# Patient Record
Sex: FEMALE | Race: WHITE | Employment: UNEMPLOYED | URBAN - METROPOLITAN AREA
[De-identification: names, ages, dates, MRNs, and addresses within clinical notes are randomized per-mention and may not be internally consistent; named-entity substitution may affect disease eponyms.]

---
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
## 2022-05-31 LAB
ANTIBODY SCREEN, EXTERNAL: NORMAL
HBSAG, EXTERNAL: NORMAL
RUBELLA, EXTERNAL: NORMAL
TYPE, ABO & RH, EXTERNAL: NORMAL

## 2022-12-09 LAB — GRBS, EXTERNAL: NORMAL

## 2022-12-28 ENCOUNTER — HOSPITAL ENCOUNTER (INPATIENT)
Age: 22
LOS: 2 days | Discharge: HOME | End: 2022-12-30
Attending: STUDENT IN AN ORGANIZED HEALTH CARE EDUCATION/TRAINING PROGRAM | Admitting: OBSTETRICS & GYNECOLOGY
Payer: COMMERCIAL

## 2022-12-28 PROBLEM — Z34.90 PREGNANCY: Status: ACTIVE | Noted: 2022-12-28

## 2022-12-28 PROCEDURE — 75410000002 HC LABOR FEE PER 1 HR: Performed by: STUDENT IN AN ORGANIZED HEALTH CARE EDUCATION/TRAINING PROGRAM

## 2022-12-28 PROCEDURE — 65270000029 HC RM PRIVATE

## 2022-12-28 RX ORDER — SODIUM CHLORIDE 0.9 % (FLUSH) 0.9 %
5-40 SYRINGE (ML) INJECTION EVERY 8 HOURS
Status: DISCONTINUED | OUTPATIENT
Start: 2022-12-29 | End: 2022-12-29

## 2022-12-28 RX ORDER — SODIUM CHLORIDE 0.9 % (FLUSH) 0.9 %
5-40 SYRINGE (ML) INJECTION AS NEEDED
Status: DISCONTINUED | OUTPATIENT
Start: 2022-12-28 | End: 2022-12-29

## 2022-12-28 RX ORDER — BUSPIRONE HYDROCHLORIDE 30 MG/1
30 TABLET ORAL 2 TIMES DAILY
COMMUNITY

## 2022-12-28 RX ORDER — OXYTOCIN/RINGER'S LACTATE 30/500 ML
10 PLASTIC BAG, INJECTION (ML) INTRAVENOUS AS NEEDED
Status: COMPLETED | OUTPATIENT
Start: 2022-12-28 | End: 2022-12-29

## 2022-12-28 RX ORDER — NALOXONE HYDROCHLORIDE 0.4 MG/ML
0.4 INJECTION, SOLUTION INTRAMUSCULAR; INTRAVENOUS; SUBCUTANEOUS AS NEEDED
Status: DISCONTINUED | OUTPATIENT
Start: 2022-12-28 | End: 2022-12-29

## 2022-12-28 RX ORDER — OXYTOCIN/RINGER'S LACTATE 30/500 ML
87.3 PLASTIC BAG, INJECTION (ML) INTRAVENOUS AS NEEDED
Status: DISCONTINUED | OUTPATIENT
Start: 2022-12-28 | End: 2022-12-29

## 2022-12-28 RX ORDER — SODIUM CHLORIDE, SODIUM LACTATE, POTASSIUM CHLORIDE, CALCIUM CHLORIDE 600; 310; 30; 20 MG/100ML; MG/100ML; MG/100ML; MG/100ML
125 INJECTION, SOLUTION INTRAVENOUS CONTINUOUS
Status: DISCONTINUED | OUTPATIENT
Start: 2022-12-29 | End: 2022-12-29

## 2022-12-29 ENCOUNTER — ANESTHESIA EVENT (OUTPATIENT)
Dept: LABOR AND DELIVERY | Age: 22
End: 2022-12-29
Payer: COMMERCIAL

## 2022-12-29 ENCOUNTER — ANESTHESIA (OUTPATIENT)
Dept: LABOR AND DELIVERY | Age: 22
End: 2022-12-29
Payer: COMMERCIAL

## 2022-12-29 LAB
BASOPHILS # BLD: 0 K/UL (ref 0–0.1)
BASOPHILS NFR BLD: 0 % (ref 0–1)
DIFFERENTIAL METHOD BLD: ABNORMAL
EOSINOPHIL # BLD: 0 K/UL (ref 0–0.4)
EOSINOPHIL NFR BLD: 0 % (ref 0–7)
ERYTHROCYTE [DISTWIDTH] IN BLOOD BY AUTOMATED COUNT: 12.7 % (ref 11.5–14.5)
HCT VFR BLD AUTO: 39.8 % (ref 35–47)
HGB BLD-MCNC: 13.6 G/DL (ref 11.5–16)
IMM GRANULOCYTES # BLD AUTO: 0.1 K/UL (ref 0–0.04)
IMM GRANULOCYTES NFR BLD AUTO: 1 % (ref 0–0.5)
LYMPHOCYTES # BLD: 1.6 K/UL (ref 0.8–3.5)
LYMPHOCYTES NFR BLD: 10 % (ref 12–49)
MCH RBC QN AUTO: 30.8 PG (ref 26–34)
MCHC RBC AUTO-ENTMCNC: 34.2 G/DL (ref 30–36.5)
MCV RBC AUTO: 90.2 FL (ref 80–99)
MONOCYTES # BLD: 0.8 K/UL (ref 0–1)
MONOCYTES NFR BLD: 5 % (ref 5–13)
NEUTS SEG # BLD: 13.3 K/UL (ref 1.8–8)
NEUTS SEG NFR BLD: 84 % (ref 32–75)
NRBC # BLD: 0 K/UL (ref 0–0.01)
NRBC BLD-RTO: 0 PER 100 WBC
PLATELET # BLD AUTO: 247 K/UL (ref 150–400)
PMV BLD AUTO: 11.2 FL (ref 8.9–12.9)
RBC # BLD AUTO: 4.41 M/UL (ref 3.8–5.2)
WBC # BLD AUTO: 15.9 K/UL (ref 3.6–11)

## 2022-12-29 PROCEDURE — 74011000250 HC RX REV CODE- 250: Performed by: ANESTHESIOLOGY

## 2022-12-29 PROCEDURE — 75410000000 HC DELIVERY VAGINAL/SINGLE: Performed by: STUDENT IN AN ORGANIZED HEALTH CARE EDUCATION/TRAINING PROGRAM

## 2022-12-29 PROCEDURE — 75410000002 HC LABOR FEE PER 1 HR: Performed by: STUDENT IN AN ORGANIZED HEALTH CARE EDUCATION/TRAINING PROGRAM

## 2022-12-29 PROCEDURE — 74011250637 HC RX REV CODE- 250/637: Performed by: STUDENT IN AN ORGANIZED HEALTH CARE EDUCATION/TRAINING PROGRAM

## 2022-12-29 PROCEDURE — 74011250636 HC RX REV CODE- 250/636: Performed by: ADVANCED PRACTICE MIDWIFE

## 2022-12-29 PROCEDURE — 74011250636 HC RX REV CODE- 250/636: Performed by: STUDENT IN AN ORGANIZED HEALTH CARE EDUCATION/TRAINING PROGRAM

## 2022-12-29 PROCEDURE — 75410000003 HC RECOV DEL/VAG/CSECN EA 0.5 HR: Performed by: STUDENT IN AN ORGANIZED HEALTH CARE EDUCATION/TRAINING PROGRAM

## 2022-12-29 PROCEDURE — 0UQMXZZ REPAIR VULVA, EXTERNAL APPROACH: ICD-10-PCS | Performed by: STUDENT IN AN ORGANIZED HEALTH CARE EDUCATION/TRAINING PROGRAM

## 2022-12-29 PROCEDURE — 00HU33Z INSERTION OF INFUSION DEVICE INTO SPINAL CANAL, PERCUTANEOUS APPROACH: ICD-10-PCS | Performed by: ANESTHESIOLOGY

## 2022-12-29 PROCEDURE — 65270000029 HC RM PRIVATE

## 2022-12-29 PROCEDURE — 0UQGXZZ REPAIR VAGINA, EXTERNAL APPROACH: ICD-10-PCS | Performed by: STUDENT IN AN ORGANIZED HEALTH CARE EDUCATION/TRAINING PROGRAM

## 2022-12-29 PROCEDURE — 77030014125 HC TY EPDRL BBMI -B: Performed by: ANESTHESIOLOGY

## 2022-12-29 PROCEDURE — 4A1HXCZ MONITORING OF PRODUCTS OF CONCEPTION, CARDIAC RATE, EXTERNAL APPROACH: ICD-10-PCS | Performed by: STUDENT IN AN ORGANIZED HEALTH CARE EDUCATION/TRAINING PROGRAM

## 2022-12-29 PROCEDURE — 76060000078 HC EPIDURAL ANESTHESIA: Performed by: ANESTHESIOLOGY

## 2022-12-29 PROCEDURE — 85025 COMPLETE CBC W/AUTO DIFF WBC: CPT

## 2022-12-29 PROCEDURE — 36415 COLL VENOUS BLD VENIPUNCTURE: CPT

## 2022-12-29 RX ORDER — OXYTOCIN/RINGER'S LACTATE 30/500 ML
87.3 PLASTIC BAG, INJECTION (ML) INTRAVENOUS AS NEEDED
Status: DISCONTINUED | OUTPATIENT
Start: 2022-12-29 | End: 2022-12-30 | Stop reason: HOSPADM

## 2022-12-29 RX ORDER — ONDANSETRON 2 MG/ML
4 INJECTION INTRAMUSCULAR; INTRAVENOUS
Status: DISCONTINUED | OUTPATIENT
Start: 2022-12-29 | End: 2022-12-29

## 2022-12-29 RX ORDER — ACETAMINOPHEN 325 MG/1
650 TABLET ORAL
Status: DISCONTINUED | OUTPATIENT
Start: 2022-12-29 | End: 2022-12-30 | Stop reason: HOSPADM

## 2022-12-29 RX ORDER — EPHEDRINE SULFATE/0.9% NACL/PF 50 MG/5 ML
10 SYRINGE (ML) INTRAVENOUS
Status: DISCONTINUED | OUTPATIENT
Start: 2022-12-29 | End: 2022-12-29

## 2022-12-29 RX ORDER — PEPPERMINT OIL
OIL (ML) MISCELLANEOUS ONCE
Status: DISCONTINUED | OUTPATIENT
Start: 2022-12-29 | End: 2022-12-29

## 2022-12-29 RX ORDER — IBUPROFEN 800 MG/1
800 TABLET ORAL EVERY 8 HOURS
Status: DISCONTINUED | OUTPATIENT
Start: 2022-12-29 | End: 2022-12-30 | Stop reason: HOSPADM

## 2022-12-29 RX ORDER — PEPPERMINT OIL
SPIRIT ORAL ONCE
Status: COMPLETED | OUTPATIENT
Start: 2022-12-29 | End: 2022-12-29

## 2022-12-29 RX ORDER — DOCUSATE SODIUM 100 MG/1
100 CAPSULE, LIQUID FILLED ORAL
Status: DISCONTINUED | OUTPATIENT
Start: 2022-12-29 | End: 2022-12-30 | Stop reason: HOSPADM

## 2022-12-29 RX ORDER — FENTANYL/BUPIVACAINE/NS/PF 2-1250MCG
1-16 PREFILLED PUMP RESERVOIR EPIDURAL CONTINUOUS
Status: DISCONTINUED | OUTPATIENT
Start: 2022-12-29 | End: 2022-12-29

## 2022-12-29 RX ORDER — BUPIVACAINE HYDROCHLORIDE 2.5 MG/ML
INJECTION, SOLUTION EPIDURAL; INFILTRATION; INTRACAUDAL AS NEEDED
Status: DISCONTINUED | OUTPATIENT
Start: 2022-12-29 | End: 2022-12-29 | Stop reason: HOSPADM

## 2022-12-29 RX ORDER — DIPHENHYDRAMINE HCL 25 MG
25 CAPSULE ORAL
Status: DISCONTINUED | OUTPATIENT
Start: 2022-12-29 | End: 2022-12-30 | Stop reason: HOSPADM

## 2022-12-29 RX ORDER — ONDANSETRON 4 MG/1
4 TABLET, ORALLY DISINTEGRATING ORAL
Status: ACTIVE | OUTPATIENT
Start: 2022-12-29 | End: 2022-12-30

## 2022-12-29 RX ORDER — OXYTOCIN/RINGER'S LACTATE 30/500 ML
10 PLASTIC BAG, INJECTION (ML) INTRAVENOUS AS NEEDED
Status: DISCONTINUED | OUTPATIENT
Start: 2022-12-29 | End: 2022-12-30 | Stop reason: HOSPADM

## 2022-12-29 RX ORDER — FOLIC ACID/MULTIVIT,IRON,MINER 0.4MG-18MG
1 TABLET ORAL DAILY
Status: DISCONTINUED | OUTPATIENT
Start: 2022-12-29 | End: 2022-12-30 | Stop reason: HOSPADM

## 2022-12-29 RX ORDER — SIMETHICONE 80 MG
80 TABLET,CHEWABLE ORAL
Status: DISCONTINUED | OUTPATIENT
Start: 2022-12-29 | End: 2022-12-30 | Stop reason: HOSPADM

## 2022-12-29 RX ADMIN — Medication 10000 MILLI-UNITS: at 08:35

## 2022-12-29 RX ADMIN — BUPIVACAINE HYDROCHLORIDE 10 ML: 2.5 INJECTION, SOLUTION EPIDURAL; INFILTRATION; INTRACAUDAL; PERINEURAL at 01:31

## 2022-12-29 RX ADMIN — ONDANSETRON 4 MG: 2 INJECTION INTRAMUSCULAR; INTRAVENOUS at 08:01

## 2022-12-29 RX ADMIN — IBUPROFEN 800 MG: 800 TABLET, FILM COATED ORAL at 17:03

## 2022-12-29 RX ADMIN — DOCUSATE SODIUM 100 MG: 100 CAPSULE, LIQUID FILLED ORAL at 17:03

## 2022-12-29 RX ADMIN — SODIUM CHLORIDE, POTASSIUM CHLORIDE, SODIUM LACTATE AND CALCIUM CHLORIDE 125 ML/HR: 600; 310; 30; 20 INJECTION, SOLUTION INTRAVENOUS at 00:23

## 2022-12-29 RX ADMIN — Medication 10 ML/HR: at 01:55

## 2022-12-29 RX ADMIN — Medication: at 12:07

## 2022-12-29 RX ADMIN — SODIUM CHLORIDE, POTASSIUM CHLORIDE, SODIUM LACTATE AND CALCIUM CHLORIDE 125 ML/HR: 600; 310; 30; 20 INJECTION, SOLUTION INTRAVENOUS at 01:25

## 2022-12-29 RX ADMIN — Medication 1 TABLET: at 12:07

## 2022-12-29 NOTE — PROGRESS NOTES
2139 Pt arrived from ER with c/o contractions. Pt reports positive fetal movement, no leaking of fluid, or vaginal bleeding. 65 RN at bedside. Anahy Maya CNM in room to assess pt. Pt to be admitted for labor. 0025 Pt requesting epidural at this time, fluid bolus started. 1745 RN spoke with Dr Meliton John MD abouot pt request for epidural. MD states she will assess pt shortly. 3603 RN at bedside. Dr Meliton John MD at bedside for epidural placement. 9534 RN and Eitan Tran at bedside to assess pt. CNM recommends AROM of forebag to further progress labor. Time allotted for questions and all questions answered. Pt agrees to AROM of forebag.

## 2022-12-29 NOTE — PROGRESS NOTES
0710 Bedside SBAR rec'd from [address] [place] [address] Patient actively pushing. RN remains in continuous attendance at the bedside. Assessment & evaluation of fetal heart rate ongoing via continuous EFM.  5514 RN remained at bedside throughout pushing. EFM continuously assessed. Vaginal delivery of viable infant. 1108 Assisted patient in ambulating to bathroom. Patient unable to void. Running water started and position changes suggested on commode. 1120 Patient still unable to void; ambulated back to bed; will continue to monitor and will re-attempt shortly. 1230 Assisted patient in ambulating to bathroom. Peppermint oil used in hat to facilitate voiding. Patient able to empty bladder. 1630 Patient transferred to [facility]; bedside SBAR given to [facility] RN.

## 2022-12-29 NOTE — H&P
[facility] History & Physical    Subjective:     Stebbins Degree is a 25 y.o. female  SIUP @ 39w4 d by Estimated Date of Delivery: 23 who arrives to L&D with chief complaint of contractions since 1400. Endorses +FM, intact membranes; Pt denies vaginal bleeding, fever/chills, chest pain, SOB, HA, scotomata, sudden swelling, nor malaise. Pregnancy problems include:   - Body mass index is 26.04 kg/m². -   Patient Active Problem List   Diagnosis Code    H/O seasonal allergies Z88.9    Pregnancy Z34.90       Allergies  - No Known Allergies    Prenatal Labs  O positive  AS negative  Rubella immune  VDRL non reactive  Hep B neg  HIV neg   GBS negative       OB History  OB History          1    Para        Term                AB        Living             SAB        IAB        Ectopic        Molar        Multiple        Live Births                     PMHx  Past Medical History:   Diagnosis Date    TMJ (temporomandibular joint syndrome)     Resolved    Anxiety     PSHx  History reviewed. No pertinent surgical history. F/SHx  Family History   Problem Relation Age of Onset    Hypertension Maternal Grandmother     Hypertension Maternal Grandfather           Home Medications:  Prior to Admission Medications   Prescriptions Last Dose Informant Patient Reported? Taking? PNV Comb #2-Iron-FA-Omega 3 29-1-400 mg cmpk 2022 at 0900  Yes Yes   Sig: Take  by mouth. Indications: pregnancy   busPIRone (BUSPAR) 30 mg tablet 2022 at 0900  Yes Yes   Sig: Take 30 mg by mouth two (2) times a day. Indications: repeated episodes of anxiety   cetirizine (ZYRTEC) 10 mg tablet 2022 at 0900  Yes Yes   Sig: Take  by mouth daily. Facility-Administered Medications: None        Review of Systems:  A comprehensive review of systems was negative except for that written in the History of Present Illness.       Objective:     Vitals:    22 2223 22 2224   BP: (!) 144/77    Pulse: 77    Resp: 16    Temp: 98.1 °F (36.7 °C)    SpO2: 99%    Weight:  66.7 kg (147 lb)   Height:  5' 3\" (1.6 m)    Bps normotensive on repeat     Body mass index is 26.04 kg/m². Physical Exam:  General:  Alert, cooperative, no distress, appears stated age. Lungs:   Clear to auscultation bilaterally. Symmetrical expansion, non-labored   Heart:  Regular rate and rhythm   GI/Abdomen:   Soft, non-tender. Gravid. Extremities: Extremities normal, atraumatic, no cyanosis or edema.    Skin: Skin color, texture, turgor normal. No rashes or lesions   /Cervical Exam: no lesions or erythema  Cervix: 4/80//-1  Fetal Presentation: Vertex by Leopolds    Electronic Fetal Monitoring    Fetal Heart Rate:    Baseline 135, moderate variability, +accelerations, no decelerations  Uterine Contractions: External, q3 min, lasting 60seconds, relaxed to palpation    Assessment:   25 y.o. female  SIUP @  39w4d  Labor   GBS negative   Category 1 strip      Plan:   Admit to unit  Cbc/T+S to lab   Plans epidural   Continuous monitoring   Anticipate vaginal delivery     Collaborative MD: Dr. Tyrone Gallegos By:  Martha Zhong, JEFFY      2022 12:59 AM

## 2022-12-29 NOTE — ANESTHESIA PROCEDURE NOTES
Epidural Block    Patient location during procedure: OB  Start time: 12/29/2022 1:21 AM  End time: 12/29/2022 1:30 AM  Reason for block: labor epidural  Staffing  Performed: attending   Anesthesiologist: Roxy Vernon MD  Preanesthetic Checklist  Completed: patient identified, IV checked, site marked, risks and benefits discussed, surgical consent, monitors and equipment checked, pre-op evaluation, timeout performed and fire risk safety assessment completed and verbalized  Block Placement  Patient position: sitting  Prep: Betadine  Sterility prep: mask, hand, gloves, drape and cap  Sedation level: no sedation  Patient monitoring: heart rate and continuous pulse oximetry  Approach: midline  Location: lumbar  Lumbar location: L3-L4  Epidural  Loss of resistance technique: air  Guidance: landmark technique  Needle  Needle type: Tuohy   Needle gauge: 17 G  Needle length: 9 cm  Needle insertion depth: 4 cm  Catheter type: multi-orifice  Catheter size: 20 G  Catheter at skin depth: 8 cm  Catheter securement method: clear occlusive dressing  Test dose: negative

## 2022-12-29 NOTE — L&D DELIVERY NOTE
Delivery Summary    Delivery Note:     Called to LDR because patient was found to be c/c/+2. Patient pushed effectively and fetal head was delivered over perineum. The anterior shoulder followed by the posterior shoulder and body were subsequently delivered. Cord noted to be wrapped around baby's left arm and reduced. The infant was placed on maternal abdomen. The cord was then clamped and cut after 1 minute of pulsation. Cord blood was taken. The placenta delivered spontaneously, intact, with 3VC. Pitocin was added to the IVF and the fundus was firm to palpation. The vagina, cervix and perineum were examined and a right sided vaginal/periurethral laceration was noted. Pressure was applied for 4 minutes but it continued to bleed so it was repaired in a running locked fashion with a 4-0 Vicryl on an SH  needle. . No complications. Mom and baby doing well. Dr. Jose Raul Reveles delivering. Harriet Barton MD  [facility]     Patient: Juan Daniel Crenshaw MRN: [id number]  SSN: [id number]    YOB: 2000  Age: 25 y.o.   Sex: female       Information for the patient's :  Beronica Quarles [[id number]]     Labor Events:    Labor: No    Steroids: None   Cervical Ripening Date/Time:       Cervical Ripening Type: None   Antibiotics During Labor: No   Rupture Identifier: Sac 1    Rupture Date/Time: 2022 11:15 PM   Rupture Type: AROM;SROM   Amniotic Fluid Volume: Large    Amniotic Fluid Description: Clear    Amniotic Fluid Odor: None    Induction: None       Induction Date/Time:        Indications for Induction:      Augmentation: None   Augmentation Date/Time:      Indications for Augmentation:     Labor complications: None       Additional complications:        Delivery Events:  Indications For Episiotomy:     Episiotomy: None   Perineal Laceration(s): None   Repaired:     Periurethral Laceration Location:      Repaired:     Labial Laceration Location: Repaired:     Sulcal Laceration Location:     Repaired:     Vaginal Laceration Location:     Repaired: Yes   Cervical Laceration Location:     Repaired:     Repair Suture: Other (See Note)   Number of Repair Packets: 1   Estimated Blood Loss (ml):  ml   Quantitative Blood Loss (ml)                Delivery Date: 2022    Delivery Time: 8:22 AM  Delivery Type: Vaginal, Spontaneous  Sex:  Female    Gestational Age: 43w3d   Delivery Clinician:  Staci Hammonds  Living Status: Living   Delivery Location: L&D            APGARS  One minute Five minutes Ten minutes   Skin color: 1   1        Heart rate: 2   2        Grimace: 2   2        Muscle tone: 2   2        Breathin   2        Totals: 9   9            Presentation: Vertex    Position: Right Occiput Anterior  Resuscitation Method:  Tactile Stimulation     Meconium Stained: None      Cord Information: 3 Vessels  Complications: None  Cord around:    Delayed cord clamping? Yes  Cord clamped date/time:2022  8:23 AM  Disposition of Cord Blood: Lab    Blood Gases Sent?: No    Placenta:  Date/Time: 2022  8:26 AM  Removal: Spontaneous      Appearance: Normal      Measurements:  Birth Weight:        Birth Length:        Head Circumference:        Chest Circumference:       Abdominal Girth: Other Providers:   JOSE Gomez;Matt PORTILLO JESSICA I.;JOHN VENTURA, Obstetrician;Primary Nurse;Primary Jay Em Nurse;Staff Nurse;Nursery Nurse         Group B Strep:   Lab Results   Component Value Date/Time    Maranda External neg 2022 12:00 AM     Information for the patient's :  Stephanie Mccarthy [[id number]]   No results found for: ABORH, PCTABR, PCTDIG, BILI, ABORHEXT, ABORH   No results for input(s): PCO2CB, PO2CB, HCO3I, SO2I, IBD, PTEMPI, SPECTI, PHICB, ISITE, IDEV, IALLEN in the last 72 hours.

## 2022-12-29 NOTE — PROGRESS NOTES
[facility] Labor Progress Note     Patient: Layla Bose MRN: [id number]  SSN: [id number]    YOB: 2000  Age: 25 y.o. Sex: female        Subjective:   Patient evaluated and is coping well with contractions post epidural.     Objective:   Blood pressure (!) 105/55, pulse 85, temperature 99.2 °F (37.3 °C), resp. rate 16, height 5' 3\" (1.6 m), weight 66.7 kg (147 lb), SpO2 98 %, not currently breastfeeding.     Sterile Vaginal Exam: /  Membranes:  SROM'd  EFM: baseline 135, moderate variability, acceleration noted, early decel     - Uterine Activity: sumit every 1-2 minutes lasting 40-60 seconds      Assessment:    SIUP @ 39w4d   Labor   Category 1 strip     Plan:   Continue current orders/management   Anticipate         Signed By:  Ally Moon CNM      2022 6:12 AM

## 2022-12-29 NOTE — DISCHARGE SUMMARY
Obstetrical Discharge Summary     Name: Eloy Irizarry MRN: [id number]  SSN: [id number]    YOB: 2000  Age: 25 y.o. Sex: female      Admit Date: 2022    Discharge Date: 2022    Attending Physician:  David Meraz MD     Delivering Physician:  Rylee Leiva MD     * Admission Diagnoses:   IUP @ 89W0V        * Discharge Diagnoses:   Delivery of a VFI via  by Pedrito Canela MD on 2022. Apgars were 9 and 9. Same a above       Additional Diagnoses:   Hospital Problems as of 2022 Date Reviewed: 2022            Codes Class Noted - Resolved POA    Pregnancy ICD-10-CM: Z34.90  ICD-9-CM: V22.2  2022 - Present Unknown          Lab Results   Component Value Date/Time    Rubella, External Immune 2022 12:00 AM    GrBStrep, External neg 2022 12:00 AM      Immunization History   Administered Date(s) Administered    DTAP Vaccine 2000, 2001, 2001, 2002, 2006    HIB Vaccine 2001, 2001, 2001, 2002    Hepatitis A Vaccine 2006    Hepatitis B Vaccine 2001, 2001, 2001    IPV 2001, 2001, 2002, 2006    MMR Vaccine 2002, 2006    Pneumococcal Vaccine (Pcv) 2001, 2001, 2002, 2002    TDAP Vaccine 2012    Varicella Virus Vaccine Live 2002       * Procedures:   Spontaneous vaginal delivery     * Discharge Condition: good    * Hospital Course: Normal hospital course following the delivery. * Disposition: Home    Discharge Medications:   Current Discharge Medication List          * Follow-up Care/Patient Instructions:   Activity: Activity as tolerated  Diet: Regular Diet  Wound Care: As directed  Followup 4-6 weeks for PP check        Signed By:  Pedrito Canela MD     2022

## 2022-12-29 NOTE — PROGRESS NOTES
12/29/2022  11:24 AM    CM met with ABDIRAHMAN to complete initial assessment and begin discharge planning. MOB verified and confirmed demographics. ABDIRAHMAN lives with LILLIANA Real ( [phone]),  at the address on file. ABDIRAHMAN is employed and plans to take adequate time off from work. KEVYN is also employed and will be taking time off. ABDIRAHMAN reports she has good family support, and feels like she has the support she needs when she returns home. ABDIRAHMAN plans to breast and bottle feed baby and has pump to use at home. [facility], will provide follow up care for infant. ABDIRAHMAN has car seat, bassinet/crib, clothing, bottles and all necessary supplies for baby. ABDIRAHMAN has LYYN,  and will be adding baby to  [facility]. CM discussed process to add baby to insurance, MOB verbalized understanding. Care Management Interventions  PCP Verified by CM: Yes Yovani Mcgee)  Mode of Transport at Discharge:  Other (see comment)  Transition of Care Consult (CM Consult): Discharge Planning  Support Systems: Spouse/Significant Other, Other Family Member(s)  Confirm Follow Up Transport: Family  Discharge Location  Patient Expects to be Discharged to[de-identified] Home with family assistance  Ash Vallejo

## 2022-12-30 VITALS
BODY MASS INDEX: 26.05 KG/M2 | SYSTOLIC BLOOD PRESSURE: 102 MMHG | DIASTOLIC BLOOD PRESSURE: 69 MMHG | RESPIRATION RATE: 16 BRPM | TEMPERATURE: 98.4 F | HEIGHT: 63 IN | WEIGHT: 147 LBS | OXYGEN SATURATION: 98 % | HEART RATE: 92 BPM

## 2022-12-30 PROCEDURE — 74011250637 HC RX REV CODE- 250/637: Performed by: STUDENT IN AN ORGANIZED HEALTH CARE EDUCATION/TRAINING PROGRAM

## 2022-12-30 RX ADMIN — Medication 1 TABLET: at 11:29

## 2022-12-30 RX ADMIN — IBUPROFEN 800 MG: 800 TABLET, FILM COATED ORAL at 11:29

## 2022-12-30 RX ADMIN — DOCUSATE SODIUM 100 MG: 100 CAPSULE, LIQUID FILLED ORAL at 11:29

## 2022-12-30 RX ADMIN — IBUPROFEN 800 MG: 800 TABLET, FILM COATED ORAL at 02:36

## 2022-12-30 NOTE — ROUTINE PROCESS
Patient off unit in stable condition via wheelchair with volunteers for discharge home per Dr. Brie Scales. Patient is to follow up in 6 weeks and is aware. Patient denies any headache, dizziness, nausea/vomitting, or pain at this time. Infant in car seat with mother.

## 2022-12-30 NOTE — DISCHARGE INSTRUCTIONS
Discharge Instructions for Vaginal Delivery    Patient ID:  Elizabeth Gutierrez  [id number]  55 y.o.  2000    Take Home Medications       Continue taking your prenatal vitamins if you are breastfeeding. Follow-up care is a key part of your treatment and safety. Please schedule and keep appointments. Follow-up with your primary OB in 4-6 weeks. Activity  Avoid anything in your vagina for 6 weeks (no intercourse, tampons, or douching). You may drive unless you are taking prescription pain medications. Climbing stairs and light lifting are okay. Please avoid excessive exercise, though walking is okay- you'll be tired! Diet  Regular diet as tolerated. Be sure to drink plenty of fluids if you are breastfeeding. Wound care  If you have stitches, continue to rinse with a squirt bottle of warm water each time you void for about 7-10 days. Your stitches will gradually dissolve over four to eight weeks. Sitz baths are also helpful to keep the wound clean, encourage healing, and to help with pain associated with the stitches or hemorrhoids. You can use either a sitz bath basin or a bathtub filled with 2-3 inches of plain warm water. Soak for 10 minutes 3 times a day as tolerated. Pain Management  Over the counter medications such as Tylenol and ibuprofen (Motrin or Advil) are ideal.  These may be taken together, alternating doses. You may  take the maximum dose:  Motrin or Advil (generic ibuprofen), either 3 tablets every 6 hours or 4 tablets every 8 hours or Tylenol (acetominophen) 1000mg every 6 hours (equivalent to 2 extra strength Tylenol). Add heating pad or sitz baths as needed. Add hemorrhoid wipes or ointments if needed    Constipation  Constipation is normal after pregnancy and delivery, especially while taking prescription narcotic pain medication. Over the counter remedies including ducosate (Colace), take 1-2 capsules 1-2 times daily for soft stool as needed.   You may also add/ try milk of magnesia or rectal remedies such as Dulcolax or Fleets enema. Recovery: What to Expect at Home  Fatigue is expected. Try to rest when you can and don't worry about doing housework or other tasks which can wait. The soreness along your bottom will improve significantly over the first 2 weeks, but it may take 6 weeks before you are completely recovered. Back pain or general body aches or muscle soreness are expected and should improve with acetominophen or ibuprofen. Leg swelling due to pregnancy and/or IV fluids given in the hospital will take about two weeks to resolve. Most women experience some form of the \"Baby Blues\" after having a baby. Feeling emotional, tearful, frustrated, anxious, sad, and irritable some of the time is normal and go away after about 2 weeks. Adequate rest and help from your family will help. Take breaks from caring for the baby. Call your doctor if your symptoms seem severe, last more than 2 weeks, or seem to be getting worse instead of better. Get help immediately if you have thoughts of wanting to hurt yourself or others! Call your doctor or seek immediate medical care if you have:  Heavy vaginal bleeding, soaking through one or more pads an hour for several hours. Foul-smelling discharge from your vagina or incision. Consistent nausea and vomiting and cannot keep fluids down. Consistent pain that does not get better after you take pain medicine.   Sudden chest pain and shortness of breath  Signs of a blood clot: pain/ swelling/ increasing redness in your lower extremeties  Signs of infection: increased pain in your abdomen or vaginal area; red streaks, warmth, or tenderness of your breasts; fever of 100.5 F or greater

## 2022-12-30 NOTE — PROGRESS NOTES
PostPartum Note    Hipolito Bonilla  [id number]  2000  22 y.o.    S:  Ms. Hipolito Bonilla is a 25 y.o.  PPD #1 s/p  @ 39w4d. Doing well. She had a baby girl. Her lochia is like a period. She describes her pain as mild and is well controlled with PO medications. She is breastfeeding and supplementing with formula feeds. She is ambulating and voiding. Tolerating PO intake. O:   Visit Vitals  /78 (BP 1 Location: Right upper arm, BP Patient Position: At rest)   Pulse 87   Temp 98.1 °F (36.7 °C)   Resp 16   Ht 5' 3\" (1.6 m)   Wt 66.7 kg (147 lb)   SpO2 98%   Breastfeeding Unknown   BMI 26.04 kg/m²       Gen - No acute distress  Respirations - nonlabored   Abdomen - Fundus firm below the umbilicus   Ext - Warm, well perfused, nontender     Lab Results   Component Value Date/Time    WBC 15.9 (H) 2022 12:21 AM    HGB 13.6 2022 12:21 AM    HCT 39.8 2022 12:21 AM    PLATELET 753  12:21 AM    MCV 90.2 2022 12:21 AM         A/P:  25 y.o.  PPD #2 s/p  @ 39w4d doing well. 1.  Routine PP instructions/ care discussed  2. Blood type - Rh +  3. Rubella immune   4. Discharge home today  5. F/U 4-6 weeks for PP check.       Joan Wallace MD  [facility]

## 2022-12-30 NOTE — LACTATION NOTE
This note was copied from a baby's chart. Mother and baby for discharge. Mother states baby has been latching on and breastfeeding well. Reviewed breastfeeding basics:  Supply and demand, breastfeed baby 8-12 times in 24 hours, feed baby on demand,  stomach size, early  Feeding cues, skin to skin, positioning and baby led latch-on, assymetrical latch with signs of good, deep latch vs shallow, feeding frequency and duration, and log sheet for tracking infant feedings and output. Breastfeeding Booklet and Warm line information given. Discussed typical  weight loss and the importance of infant weight checks with pediatrician 1-2 post discharge. Discussed eating a healthy diet. Instructed mother to eat a variety of foods in order to get a well balanced diet. She should consume an extra 500 calories per day (more than her non-pregnant requirement.) These extra calories will help provide energy needed for optimal breast milk production. Mother also encouraged to \"drink to thirst\" and it is recommended that she drink fluids such as water, fruit/vegetable juice. Nutritious snacks should be available so that she can eat throughout the day to help satisfy her hunger and maintain a good milk supply. Care for sore/tender nipples discussed:  ways to improve positioning and latch practiced and discussed, hand express colostrum after feedings and let air dry, light application of lanolin, hydrogel pads, seek comfortable laid back feeding position, start feedings on least sore side first.     Engorgement Care Guidelines:  Reviewed how milk is made and normal phases of milk production. Taught care of engorged breasts - frequent breastfeeding encouraged, cool packs and motrin as tolerated. Anticipatory guidance shared. Discussed pumping/storage and preparation of expressed breast milk for baby.     Mother will successfully establish breastfeeding by feeding in response to early feeding cues   or wake every 3h, will obtain deep latch, and will keep log of feedings/output. Taught to BF at hunger cues and or q 2-3 hrs and to offer 10-20 drops of hand expressed colostrum at any non-feeds. Breast- Feeding Assessment  Attends Breast-Feeding Classes: Yes  Breast-Feeding Experience: No  Breast Trauma/Surgery: No  Type/Quality: Good  Lactation Consultant Visits  Breast-Feedings: Good  (Baby last breast fed at 10:00 for 10 minutes.)     Chart shows numerous feedings, void, stool WNL. Discussed importance of monitoring outputs and feedings on first week of life. Discussed ways to tell if baby is  getting enough breast milk, ie  voids and stools, change in color of stool, and return to birth wt within 2 weeks. Follow up with pediatrician visit for weight check in 1-2 days (per AAP guidelines.)  Encouraged to call Warm Line  [phone]  for any questions/problems that arise.  Mother also given breastfeeding support group dates and times for any future needs